# Patient Record
Sex: FEMALE | Race: WHITE | NOT HISPANIC OR LATINO | Employment: FULL TIME | URBAN - METROPOLITAN AREA
[De-identification: names, ages, dates, MRNs, and addresses within clinical notes are randomized per-mention and may not be internally consistent; named-entity substitution may affect disease eponyms.]

---

## 2020-11-13 ENCOUNTER — OFFICE VISIT - HEALTHEAST (OUTPATIENT)
Dept: FAMILY MEDICINE | Facility: CLINIC | Age: 25
End: 2020-11-13

## 2020-11-13 DIAGNOSIS — Z23 NEEDS FLU SHOT: ICD-10-CM

## 2020-11-13 DIAGNOSIS — Z30.017 NEXPLANON INSERTION: ICD-10-CM

## 2020-11-13 DIAGNOSIS — Z30.46 NEXPLANON REMOVAL: ICD-10-CM

## 2020-11-13 ASSESSMENT — MIFFLIN-ST. JEOR: SCORE: 1377.72

## 2020-12-29 ENCOUNTER — OFFICE VISIT - HEALTHEAST (OUTPATIENT)
Dept: FAMILY MEDICINE | Facility: CLINIC | Age: 25
End: 2020-12-29

## 2020-12-29 DIAGNOSIS — Z00.00 ROUTINE GENERAL MEDICAL EXAMINATION AT A HEALTH CARE FACILITY: ICD-10-CM

## 2020-12-29 LAB
ALBUMIN UR-MCNC: NEGATIVE MG/DL
APPEARANCE UR: CLEAR
BACTERIA #/AREA URNS HPF: ABNORMAL HPF
BILIRUB UR QL STRIP: NEGATIVE
CHOLEST SERPL-MCNC: 146 MG/DL
COLOR UR AUTO: YELLOW
FASTING STATUS PATIENT QL REPORTED: YES
GLUCOSE UR STRIP-MCNC: NEGATIVE MG/DL
HDLC SERPL-MCNC: 42 MG/DL
HGB BLD-MCNC: 14 G/DL (ref 12–16)
HGB UR QL STRIP: ABNORMAL
KETONES UR STRIP-MCNC: NEGATIVE MG/DL
LDLC SERPL CALC-MCNC: 91 MG/DL
LEUKOCYTE ESTERASE UR QL STRIP: NEGATIVE
MUCOUS THREADS #/AREA URNS LPF: ABNORMAL LPF
NITRATE UR QL: NEGATIVE
PH UR STRIP: 6 [PH] (ref 5–8)
RBC #/AREA URNS AUTO: ABNORMAL HPF
SP GR UR STRIP: 1.02 (ref 1–1.03)
SQUAMOUS #/AREA URNS AUTO: ABNORMAL LPF
TRIGL SERPL-MCNC: 63 MG/DL
UROBILINOGEN UR STRIP-ACNC: ABNORMAL
WBC #/AREA URNS AUTO: ABNORMAL HPF

## 2020-12-29 ASSESSMENT — MIFFLIN-ST. JEOR: SCORE: 1385.66

## 2020-12-30 LAB
HPV SOURCE: NORMAL
HUMAN PAPILLOMA VIRUS 16 DNA: NEGATIVE
HUMAN PAPILLOMA VIRUS 18 DNA: NEGATIVE
HUMAN PAPILLOMA VIRUS FINAL DIAGNOSIS: NORMAL
HUMAN PAPILLOMA VIRUS OTHER HR: NEGATIVE
SPECIMEN DESCRIPTION: NORMAL

## 2021-06-05 VITALS
DIASTOLIC BLOOD PRESSURE: 66 MMHG | WEIGHT: 145 LBS | HEART RATE: 92 BPM | HEIGHT: 65 IN | SYSTOLIC BLOOD PRESSURE: 104 MMHG | BODY MASS INDEX: 24.16 KG/M2 | OXYGEN SATURATION: 98 % | RESPIRATION RATE: 18 BRPM

## 2021-06-05 VITALS
WEIGHT: 145 LBS | HEART RATE: 95 BPM | BODY MASS INDEX: 24.75 KG/M2 | HEIGHT: 64 IN | OXYGEN SATURATION: 98 % | SYSTOLIC BLOOD PRESSURE: 98 MMHG | DIASTOLIC BLOOD PRESSURE: 62 MMHG

## 2021-06-13 NOTE — PROGRESS NOTES
"PROGRESS NOTE   11/13/2020    SUBJECTIVE:  Heidi White is a 25 y.o. female  who presents for   Chief Complaint   Patient presents with     Contraception     2017 insertion, no issues with it, would like reinserted      Patient comes in today for removal of her Nexplanon and reinsertion of a new Nexplanon implant. Patient is new patient to the clinic. Please see past medical history, surgical history, social history and family history, all of which were completed in their entirety today.  She notes that she was going to [facility] in [place] but her insurance changed and now she needs to come here for her care.  She is overall very healthy female.  She had her Nexplanon placed in 2017.  Is been working very well for her.  She would like to replace her Nexplanon with a new Nexplanon.  She has no chronic medical problems.  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  She has not yet had a flu vaccination and will go ahead and give that today.    There is no problem list on file for this patient.      Current Outpatient Medications   Medication Sig Dispense Refill     mv-mn/iron fum/FA/omega3,6,9#3 (WOMEN'S MULTI ORAL) Take by mouth.       No current facility-administered medications for this visit.        Allergies   Allergen Reactions     Pineapple Anaphylaxis     Hard time breathing       Past Medical History:   Diagnosis Date     Nexplanon in place     placed 11/2020       Past Surgical History:   Procedure Laterality Date     breast lump removed Right 02/21/2020    benign     lump from back removed  2016    large mass removed     NASAL SEPTUM SURGERY  2017    deviated septum repaired       Social History     Tobacco Use   Smoking Status Never Smoker   Smokeless Tobacco Never Used       OBJECTIVE:     BP 98/62   Pulse 95   Ht 5' 4\" (1.626 m)   Wt 145 lb (65.8 kg)   LMP 10/27/2020 (Approximate) Comment: Patient doesn't get cycles monthly " due to implant  SpO2 98%   Breastfeeding No   BMI 24.89 kg/m      Physical Exam:  GENERAL APPEARANCE: A&A, NAD, well hydrated, well nourished  SKIN:  Normal skin turgor, no lesions/rashes   CV: RRR, no M/G/R   LUNGS: CTAB   EXTREMITY: no swelling noted.  Full range of motion of all 4 extremities.   NEURO: no gross deficits   PSYCHIATRIC;  Mood appropriate, memory intact      ASSESSMENT/PLAN:     Nexplanon removal [Z30.46]      1. Nexplanon removal    2. Nexplanon insertion    3. Needs flu shot  - Influenza, Seasonal Quad, PF =/> 6months    Patient comes in today for establishment of care.  She is an overall very healthy female.  She does not have any chronic medical problems.  She has had Nexplanon placed in 2017 so it does need to be removed and replaced today.  Removal of the old Nexplanon and insertion of a new Nexplanon was done today without problems or complications.  Please see note below.  Flu vaccination was given today as well.  We did discuss the fact that she is overdue for a physical exam.  She will schedule that within the next couple of months.  That appointment was actually scheduled prior to her leaving the clinic today as well.  All of her questions were answered today.  Leslee Jones MD    This note was dictated using voice recognition software. Any grammatical errors, context distortions, or spelling errors are not intentional             NEXPLANON REMOVAL    Patient presents with desire to have nexplanon removed.  Her Nexplanon was placed in 2017.  It is working well for her.  She would like to have the Nexplanon removed and a new one placed.  Her non-dominant arm is her right.  She has been counseled regarding the efficacy, bleeding pattern changes, side effect profile, and given a description of the removal and insertion procedure.   This is all reviewed with her today and she gives full informed consent, signing the form.  Questions were answered.      Current Outpatient Medications:  "     mv-mn/iron fum/FA/omega3,6,9#3 (WOMEN'S MULTI ORAL), Take by mouth., Disp: , Rfl:     Allergies   Allergen Reactions     Pineapple Anaphylaxis     Hard time breathing       OBJECTIVE:  BP 98/62   Pulse 95   Ht 5' 4\" (1.626 m)   Wt 145 lb (65.8 kg)   LMP 10/27/2020 (Approximate) Comment: Patient doesn't get cycles monthly due to implant  SpO2 98%   Breastfeeding No   BMI 24.89 kg/m      Risks, benefits and alternatives to procedure were discussed with the patient.  We discussed possible complications and risks including infection, bleeding, pain and failure.  Written consent was obtained prior to the procedure and is detailed in the patient's record.      Patient's current Nexplanon is still active and therefore pregnancy test was not completed today.  Patient's last menstrual period was 10/27/2020.    The patient is placed in supine position with the nondominant arm flexed at the elbow.  The patient does not have allergies to antiseptic.  The patient's arm was then washed in a sterile fashion.  The Nexplanon capsule was identified by palpation.  Using 3 mL of 1% lidocaine the site is anesthetized.  This was applied under the Nexplanon capsule.  A stab incision is made with a 11 blade at the tip of the distal Nexplanon site.  Pushing the Nexplanon toward the incision until the tip is  visible, the implant was grasped with forceps.  An incision into the connective tissue sheath was made and the Nexplanon easily removed with forceps.    The entire nan 4 cm in length was removed in its entirety and confirmed by measurement.     The new Nexplanon applicator was then removed from packaging and the needle shield removed.  The Nexplanon nan was visualized.    The Nexplanon needle was then entered at the site of the previous nexplanon and lowered to a horizontal position.  Keeping the needle in the sub-dermal connective tissue, the nexplanon was inserted to its full length.  The lever was cocked upon withdrawal " and the capsule dropped into the subdermal connective tissue.   Insertion was confirmed by visual inspection of the tip of  the needle and palpation of the patient's arm.     The incision was then closed with an adhesive bandage. The surgical site was then washed with water, then dried. A gauze pressure bandage was then applied to reduce bruising.      Complications: There were no complications and hemostasis was achieved.      Bleeding was well controlled following procedure and all questions answered.    The patient was advised that the implant should be removed within 3 years.      Postoperative instructions: Patient should call if she has any pain not relieved by ibuprofen or Tylenol.  The gauze dressing is intended to help reduce bruising.  It should not be tight.  This can be removed in the morning, sooner if too tight.  Patient was able to palpate the new implant.  She was given a card identifying the implantable device.  She understands that it should be removed within 3 years.      ASSESSMENT:     Nexplanon removal [Z30.46]      1. Nexplanon removal    2. Nexplanon insertion    3. Needs flu shot  - Influenza, Seasonal Quad, PF =/> 6months      Nexplanon was removed and a new nexplanon was inserted today without complications or problems.  Bleeding was well controlled following procedure and all questions answered.  She is due for a physical exam and that was set up today to occur within the next couple of months.  All of her questions were answered.    Leslee Jones MD

## 2021-08-06 ENCOUNTER — IMMUNIZATION (OUTPATIENT)
Dept: NURSING | Facility: CLINIC | Age: 26
End: 2021-08-06
Payer: COMMERCIAL

## 2021-08-06 PROCEDURE — 91300 PR COVID VAC PFIZER DIL RECON 30 MCG/0.3 ML IM: CPT

## 2021-08-06 PROCEDURE — 0001A PR COVID VAC PFIZER DIL RECON 30 MCG/0.3 ML IM: CPT

## 2021-09-03 ENCOUNTER — IMMUNIZATION (OUTPATIENT)
Dept: NURSING | Facility: CLINIC | Age: 26
End: 2021-09-03
Attending: PEDIATRICS
Payer: COMMERCIAL

## 2021-09-03 PROCEDURE — 0002A PR COVID VAC PFIZER DIL RECON 30 MCG/0.3 ML IM: CPT

## 2021-09-03 PROCEDURE — 91300 PR COVID VAC PFIZER DIL RECON 30 MCG/0.3 ML IM: CPT

## 2021-10-11 ENCOUNTER — HEALTH MAINTENANCE LETTER (OUTPATIENT)
Age: 26
End: 2021-10-11

## 2022-01-30 ENCOUNTER — HEALTH MAINTENANCE LETTER (OUTPATIENT)
Age: 27
End: 2022-01-30

## 2022-09-24 ENCOUNTER — HEALTH MAINTENANCE LETTER (OUTPATIENT)
Age: 27
End: 2022-09-24

## 2023-05-08 ENCOUNTER — HEALTH MAINTENANCE LETTER (OUTPATIENT)
Age: 28
End: 2023-05-08

## 2024-03-05 ENCOUNTER — TELEPHONE (OUTPATIENT)
Dept: FAMILY MEDICINE | Facility: CLINIC | Age: 29
End: 2024-03-05
Payer: COMMERCIAL

## 2024-03-05 NOTE — TELEPHONE ENCOUNTER
Left message for pt. Pt looks to be scheduled with Dr. Workman for a Nexplanon removal on 03/07/2024. Dr. Workman does not do removal or insertion of nexplanon pt will need to be rescheduled with Dr. Butt or Dione Graves if she is wanting this completed at [facility].

## 2024-03-19 ENCOUNTER — OFFICE VISIT (OUTPATIENT)
Dept: FAMILY MEDICINE | Facility: CLINIC | Age: 29
End: 2024-03-19
Payer: COMMERCIAL

## 2024-03-19 VITALS
HEART RATE: 90 BPM | TEMPERATURE: 100.2 F | RESPIRATION RATE: 16 BRPM | OXYGEN SATURATION: 99 % | BODY MASS INDEX: 28.32 KG/M2 | HEIGHT: 65 IN | WEIGHT: 170 LBS | SYSTOLIC BLOOD PRESSURE: 106 MMHG | DIASTOLIC BLOOD PRESSURE: 68 MMHG

## 2024-03-19 DIAGNOSIS — Z30.46 ENCOUNTER FOR REMOVAL AND REINSERTION OF NEXPLANON: Primary | ICD-10-CM

## 2024-03-19 PROCEDURE — 11983 REMOVE/INSERT DRUG IMPLANT: CPT

## 2024-03-19 ASSESSMENT — PAIN SCALES - GENERAL: PAINLEVEL: NO PAIN (0)

## 2024-03-19 NOTE — PROGRESS NOTES
"  Assessment & Plan     Encounter for removal and reinsertion of Nexplanon  The patient tolerated the procedure well without  complications.  Standard post-procedure care is explained and return precautions are given.   - etonogestrel (NEXPLANON) subdermal implant 68 mg  - REMOVE & REINSERT NON-BIODEGRADABLE DRUG DELIVERY IMPLANT      Kat Rivera is a 28 year old, presenting for the following health issues:  nexplanon (Removal from L arm and replacement. )        3/19/2024     7:50 AM   Additional Questions   Roomed by Ann Marie KENYON LPN     History of Present Illness       Reason for visit:  Nexplanon removal and reinsertion    She eats 2-3 servings of fruits and vegetables daily.She consumes 2 sweetened beverage(s) daily.She exercises with enough effort to increase her heart rate 9 or less minutes per day.  She exercises with enough effort to increase her heart rate 3 or less days per week.   She is taking medications regularly.     Patient presents for removal of nexplanon implant (placed 11/2023) and reinsertion. This is the 3rd nexplanon implant she will have.         Objective    /68   Pulse 90   Temp 100.2  F (37.9  C) (Oral)   Resp 16   Ht 1.651 m (5' 5\")   Wt 77.1 kg (170 lb)   LMP 02/25/2024 (Approximate)   SpO2 99%   Breastfeeding No   BMI 28.29 kg/m    Body mass index is 28.29 kg/m .  Physical Exam   GENERAL: alert and no distress  RESP: normal respiratory effort  SKIN: no suspicious lesions or rashes. Nexplanon implant is palpable on left upper arm.  PSYCH: mentation appears normal, affect normal/bright    PROCEDURE: Nexplanon Removal and Insertion  Anesthesia: 1% Lidocaine w/ epinephrine 3 ml   Procedure: Consent obtained. A time-out was performed prior to  initiating procedure to be sure of right patient and right location. The  area surrounding the Nexplanon was prepared with betadine swab x3 and draped  in the usual sterile manner. The site was anesthetized with lidocaine.  A skin " incision was made over the distal aspect of the device. The  capsule lysed sharply and the device removed using a hemostat. The length of the nexplanon was confirmed to be 4cm. Then attention was turned to inserting the new nexplanon implant. Nexplanon  trocar was inserted subcutaneously and then Nexplanon  capsule delivered subcutaneously. Trocar was removed from the insertion site. Nexplanon  capsule was palpated by provider and patient to assure satisfactory placement. Estimated blood loss of 0 mLHemostasis was assured. The site was dressed with  SteriStrips and a pressure dressing. The patient tolerated the procedure  well.            Signed Electronically by: Marion Villegas PA-C

## 2024-07-14 ENCOUNTER — HEALTH MAINTENANCE LETTER (OUTPATIENT)
Age: 29
End: 2024-07-14

## 2025-07-19 ENCOUNTER — HEALTH MAINTENANCE LETTER (OUTPATIENT)
Age: 30
End: 2025-07-19